# Patient Record
(demographics unavailable — no encounter records)

---

## 2025-01-30 NOTE — PHYSICAL EXAM
[Well Developed] : well developed [Well Nourished] : well nourished [No Acute Distress] : no acute distress [Normal Conjunctiva] : normal conjunctiva [Normal Venous Pressure] : normal venous pressure [Normal S1, S2] : normal S1, S2 [No Murmur] : no murmur [No Rub] : no rub [No Gallop] : no gallop [Clear Lung Fields] : clear lung fields [Good Air Entry] : good air entry [No Respiratory Distress] : no respiratory distress  [Soft] : abdomen soft [Non Tender] : non-tender [No Masses/organomegaly] : no masses/organomegaly [Normal Bowel Sounds] : normal bowel sounds [Normal Gait] : normal gait [No Edema] : no edema [No Cyanosis] : no cyanosis [No Clubbing] : no clubbing [No Varicosities] : no varicosities [No Rash] : no rash [No Skin Lesions] : no skin lesions [Moves all extremities] : moves all extremities [No Focal Deficits] : no focal deficits [Normal Speech] : normal speech [Alert and Oriented] : alert and oriented [Normal memory] : normal memory [de-identified] : soft bilateral carotid bruits

## 2025-01-30 NOTE — REASON FOR VISIT
[FreeTextEntry1] : YESENIA DURAN is being seen for an initial evaluation of chest pain. \par  \par  Patient accompanied by spouse. \par

## 2025-01-30 NOTE — DISCUSSION/SUMMARY
[FreeTextEntry1] : Will schedule for ETT. Maintain on current medical regimen for now. Weight loss discussed in detail. Graduated exercise regimen if ETT results are benign. \par  \par   [EKG obtained to assist in diagnosis and management of assessed problem(s)] : EKG obtained to assist in diagnosis and management of assessed problem(s)